# Patient Record
Sex: MALE | Race: WHITE | NOT HISPANIC OR LATINO | Employment: FULL TIME | URBAN - METROPOLITAN AREA
[De-identification: names, ages, dates, MRNs, and addresses within clinical notes are randomized per-mention and may not be internally consistent; named-entity substitution may affect disease eponyms.]

---

## 2023-10-05 PROBLEM — K21.9 HIATAL HERNIA WITH GERD: Status: ACTIVE | Noted: 2023-10-05

## 2023-10-05 PROBLEM — M67.819 TENDINOSIS OF ROTATOR CUFF: Status: ACTIVE | Noted: 2023-10-05

## 2023-10-05 PROBLEM — E66.9 OBESITY: Status: ACTIVE | Noted: 2023-10-05

## 2023-10-05 PROBLEM — K44.9 HIATAL HERNIA WITH GERD: Status: ACTIVE | Noted: 2023-10-05

## 2023-10-05 PROBLEM — M75.42 IMPINGEMENT SYNDROME OF LEFT SHOULDER: Status: ACTIVE | Noted: 2023-10-05

## 2023-10-05 PROBLEM — R07.9 CHEST PAIN: Status: ACTIVE | Noted: 2023-10-05

## 2023-10-05 PROBLEM — S46.012A TRAUMATIC TEAR OF LEFT ROTATOR CUFF: Status: ACTIVE | Noted: 2023-10-05

## 2023-10-05 PROBLEM — G47.33 OBSTRUCTIVE SLEEP APNEA SYNDROME: Status: ACTIVE | Noted: 2023-10-05

## 2023-10-05 RX ORDER — MULTIVIT-MIN/FERROUS FUMARATE 15 MG
1 TABLET ORAL DAILY
COMMUNITY
Start: 2020-09-21

## 2023-10-05 RX ORDER — ACETAMINOPHEN 500 MG
2 TABLET ORAL EVERY 8 HOURS
COMMUNITY
Start: 2023-06-23

## 2023-10-05 RX ORDER — PANTOPRAZOLE SODIUM 40 MG/1
1 TABLET, DELAYED RELEASE ORAL DAILY
COMMUNITY
Start: 2020-09-21

## 2023-10-05 RX ORDER — OXYCODONE HYDROCHLORIDE 5 MG/1
TABLET ORAL
COMMUNITY
Start: 2023-06-23

## 2023-10-05 RX ORDER — PANTOPRAZOLE SODIUM 20 MG/1
TABLET, DELAYED RELEASE ORAL
COMMUNITY

## 2023-10-05 RX ORDER — ONDANSETRON 4 MG/1
TABLET, FILM COATED ORAL
COMMUNITY
Start: 2023-06-23

## 2023-10-05 RX ORDER — ECHINACEA 400 MG
CAPSULE ORAL
COMMUNITY
Start: 2020-09-21

## 2023-10-05 RX ORDER — FENUGREEK SEED/BL.THISTLE/ANIS 340 MG
CAPSULE ORAL
COMMUNITY
Start: 2020-09-21

## 2023-10-05 RX ORDER — GARLIC 1000 MG
CAPSULE ORAL
COMMUNITY
Start: 2020-09-21

## 2023-10-05 RX ORDER — ACETAMINOPHEN 160 MG/5ML
SUSPENSION ORAL
COMMUNITY
Start: 2020-10-13

## 2023-10-05 RX ORDER — TURMERIC ROOT EXTRACT 500 MG
TABLET ORAL
COMMUNITY

## 2023-10-06 ENCOUNTER — OFFICE VISIT (OUTPATIENT)
Dept: ORTHOPEDIC SURGERY | Facility: HOSPITAL | Age: 63
End: 2023-10-06
Payer: COMMERCIAL

## 2023-10-06 VITALS — WEIGHT: 260 LBS | HEIGHT: 71 IN | BODY MASS INDEX: 36.4 KG/M2

## 2023-10-06 DIAGNOSIS — S46.012D TRAUMATIC TEAR OF LEFT ROTATOR CUFF, UNSPECIFIED TEAR EXTENT, SUBSEQUENT ENCOUNTER: ICD-10-CM

## 2023-10-06 PROCEDURE — G0463 HOSPITAL OUTPT CLINIC VISIT: HCPCS | Performed by: STUDENT IN AN ORGANIZED HEALTH CARE EDUCATION/TRAINING PROGRAM

## 2023-10-06 PROCEDURE — 99213 OFFICE O/P EST LOW 20 MIN: CPT | Performed by: STUDENT IN AN ORGANIZED HEALTH CARE EDUCATION/TRAINING PROGRAM

## 2023-10-06 NOTE — PROGRESS NOTES
Follow up Left shoulder arthroscopy, rotator cuff debridement, intra-articular debridement done 6/23/23. Genaro is still lacking ROM in his left shoulder and is experiencing pain with lateral raises. He has been more and more physically active, through physical therapy and working outside, which has led to increased shoulder soreness. Paresthesia reported following periods of prolonged activity. No other issues or concerns to report.

## 2023-10-10 NOTE — PROGRESS NOTES
PRIMARY CARE PHYSICIAN: Queta Mendiola MD    ORTHOPAEDIC POSTOPERATIVE VISIT    ASSESSMENT & PLAN    Impression: 63 y.o. male 3 months postop s/p left shoulder arthroscopic debridement.     Plan:   Overall he is doing well.  He has some residual stiffness in the shoulder.  He is continuing to work through this with physical therapy.  No other complaints at this time.    I reviewed the intraoperative findings with the patient and answered all their questions. I reviewed their postoperative timeline and plan with them. They understand the postoperative precautions and the treatment plan going forward.     Follow-Up: Patient will follow-up as needed    At the end of the visit, all questions were answered in full. The patient is in agreement with the plan and recommendations. They will call the office with any questions/concerns.      Note dictated with [employer] software. Completed without full typed error editing and sent to avoid delay.      SUBJECTIVE  CHIEF COMPLAINT:   Chief Complaint   Patient presents with    Left Shoulder - Post-op        HPI: Genaro Pandya is a 63 y.o. patient. Genaro Pandya is now postop status post left shoulder arthroscopic debridement    REVIEW OF SYSTEMS  Constitutional: See HPI for pain assessment, No significant weight loss, recent trauma  Cardiovascular: No chest pain, shortness of breath  Respiratory: No difficulty breathing, cough  Gastrointestinal: No nausea, vomiting, diarrhea, constipation  Musculoskeletal: Noted in HPI, positive for pain, restricted motion, stiffness  Integumentary: No rashes, easy bruising, redness   Neurological: no numbness or tingling in extremities, no gait disturbances   Psychiatric: No mood changes, memory changes, social issues  Heme/Lymph: no excessive swelling, easy bruising, excessive bleeding  ENT: No hearing changes  Eyes: No vision changes    CURRENT MEDICATIONS:   Current Outpatient Medications   Medication Sig Dispense  "Refill    oxyCODONE (Roxicodone) 5 mg immediate release tablet Take by mouth.      acetaminophen (Tylenol) 500 mg tablet Take 2 tablets (1,000 mg) by mouth every 8 hours.      acetaminophen 160 mg/5 mL (5 mL) suspension Take by mouth.      CREATINE MONOHYDRATE ORAL 0   once a day      docosahexaenoic acid/epa (FISH OIL ORAL) Take by mouth.      elderberry fruit and flower 460-115 mg capsule Take by mouth.      garlic 1,000 mg capsule Take by mouth.      Lactobac 42-Bifid [id number] (Probiotic Plus Colostrum) -50 mg powder in packet Take by mouth.  TAKE AS DIRECTED.      multivit-min-ferrous fumarate 15 mg iron tablet Take 1 tablet by mouth once daily.      MULTIVITAMIN ORAL Take 1 tablet by mouth once daily.      naproxen sodium (ALEVE ORAL) Take by mouth.      ondansetron (Zofran) 4 mg tablet Take by mouth.      pantoprazole (ProtoNix) 20 mg EC tablet Take by mouth.      pantoprazole (ProtoNix) 40 mg EC tablet Take 1 tablet (40 mg) by mouth once daily.      SAW PALMETTO ORAL Take by mouth.      turmeric root extract 500 mg tablet Take by mouth.      ubidecarenone (COENZYME Q10, BULK, MISC) Take by mouth. USE AS DIRECTED.       No current facility-administered medications for this visit.        OBJECTIVE    PHYSICAL EXAM      9/21/2020     2:30 PM 4/27/2022     8:18 AM 9/6/2022     8:14 AM 11/15/2022     9:47 AM 5/17/2023     8:21 AM 5/17/2023     8:24 AM 10/6/2023     3:49 PM   Vitals   Height (in) 1.803 m (5' 11\") 1.803 m (5' 11\") 1.803 m (5' 11\") 1.803 m (5' 11\") 1.803 m (5' 11\") 1.803 m (5' 11\") 1.803 m (5' 11\")   Weight (lb) 238 214 214 214 214 261.5 260   BMI 33.19 kg/m2 29.85 kg/m2 29.85 kg/m2 29.85 kg/m2 29.85 kg/m2 36.47 kg/m2 36.26 kg/m2   BSA (m2) 2.33 m2 2.21 m2 2.21 m2 2.21 m2 2.21 m2 2.44 m2 2.43 m2   Visit Report       Report      Body mass index is 36.26 kg/m².    General: Well-appearing, no acute distress    Skin intact bilateral upper and lower extremities  No erythema  No " warmth    Detailed examination of the left shoulder demonstrates:  Surgical incision(s) healing well, Steri-Strips in place  No erythema or warmth  No drainage  No ecchymosis  Resolving swelling, minimal  Biceps contour normal  Shoulder range of motion: Forward flexion to 120, ER 20, IR mid lumbar  Good resistance with Jobes, ER and belly press testing  Upper extremity motor grossly intact  C5-T1 sensation intact bilaterally  2+ radial pulses bilaterally  Warm and well-perfused, brisk capillary refill    IMAGING:   No new imaging      Arturo Hassan MD  Attending Surgeon    Sports Medicine Orthopaedic Surgery  [facility] [place]  [employer]

## 2023-10-12 ENCOUNTER — TELEPHONE (OUTPATIENT)
Dept: ORTHOPEDIC SURGERY | Facility: CLINIC | Age: 63
End: 2023-10-12
Payer: COMMERCIAL

## 2023-10-13 DIAGNOSIS — S46.012D TRAUMATIC TEAR OF LEFT ROTATOR CUFF, UNSPECIFIED TEAR EXTENT, SUBSEQUENT ENCOUNTER: ICD-10-CM

## 2023-10-20 ENCOUNTER — APPOINTMENT (OUTPATIENT)
Dept: ORTHOPEDIC SURGERY | Facility: HOSPITAL | Age: 63
End: 2023-10-20
Payer: COMMERCIAL

## 2023-10-31 ENCOUNTER — TELEPHONE (OUTPATIENT)
Dept: ORTHOPEDIC SURGERY | Facility: CLINIC | Age: 63
End: 2023-10-31
Payer: COMMERCIAL

## 2023-10-31 NOTE — TELEPHONE ENCOUNTER
Genaro called stating that he went his pain management the other day. He was told that they can't help him do to him being under your care. He's requesting some pain medication to be sent to his pharmacy.

## 2024-01-08 ENCOUNTER — OFFICE VISIT (OUTPATIENT)
Dept: ORTHOPEDIC SURGERY | Facility: CLINIC | Age: 64
End: 2024-01-08
Payer: COMMERCIAL

## 2024-01-08 VITALS — BODY MASS INDEX: 36.4 KG/M2 | WEIGHT: 260 LBS | HEIGHT: 71 IN

## 2024-01-08 DIAGNOSIS — S46.012D TRAUMATIC TEAR OF LEFT ROTATOR CUFF, UNSPECIFIED TEAR EXTENT, SUBSEQUENT ENCOUNTER: ICD-10-CM

## 2024-01-08 PROCEDURE — 99213 OFFICE O/P EST LOW 20 MIN: CPT | Performed by: STUDENT IN AN ORGANIZED HEALTH CARE EDUCATION/TRAINING PROGRAM

## 2024-01-08 NOTE — PROGRESS NOTES
PRIMARY CARE PHYSICIAN: Queta Mendiola MD    ORTHOPAEDIC POSTOPERATIVE VISIT    ASSESSMENT & PLAN    Impression: 63 y.o. male 6 months postop s/p left shoulder arthroscopic debridement.     Plan:   Overall he is doing well.  He has some residual stiffness in the shoulder.  He is planning to return to the gym in order to prepare himself for his return to work physical.  He is not yet ready to return to work and therefore will remain out of work until April 1.  At that time he will plan to return to work without restrictions.    I reviewed their postoperative timeline and plan with them. They understand the postoperative precautions and the treatment plan going forward.     Follow-Up: Patient will follow-up as needed    At the end of the visit, all questions were answered in full. The patient is in agreement with the plan and recommendations. They will call the office with any questions/concerns.      Note dictated with [employer] software. Completed without full typed error editing and sent to avoid delay.      SUBJECTIVE  CHIEF COMPLAINT: Left shoulder pain    HPI: Genaro Pandya is a 63 y.o. patient. Genaro Pandya is now postop status post left shoulder arthroscopic debridement. He feels he isn't where he needs to be to go back to work and that he would not be able to pass his physical at this time.  He has returned to the gym to work on his strength.    REVIEW OF SYSTEMS  Constitutional: See HPI for pain assessment, No significant weight loss, recent trauma  Cardiovascular: No chest pain, shortness of breath  Respiratory: No difficulty breathing, cough  Gastrointestinal: No nausea, vomiting, diarrhea, constipation  Musculoskeletal: Noted in HPI, positive for pain, restricted motion, stiffness  Integumentary: No rashes, easy bruising, redness   Neurological: no numbness or tingling in extremities, no gait disturbances   Psychiatric: No mood changes, memory changes, social issues  Heme/Lymph:  "no excessive swelling, easy bruising, excessive bleeding  ENT: No hearing changes  Eyes: No vision changes    CURRENT MEDICATIONS:   Current Outpatient Medications   Medication Sig Dispense Refill    acetaminophen (Tylenol) 500 mg tablet Take 2 tablets (1,000 mg) by mouth every 8 hours.      acetaminophen 160 mg/5 mL (5 mL) suspension Take by mouth.      CREATINE MONOHYDRATE ORAL 0   once a day      docosahexaenoic acid/epa (FISH OIL ORAL) Take by mouth.      elderberry fruit and flower 460-115 mg capsule Take by mouth.      garlic 1,000 mg capsule Take by mouth.      Lactobac 42-Bifid 1-rrniny-TRN (Probiotic Plus Colostrum) -50 mg powder in packet Take by mouth.  TAKE AS DIRECTED.      multivit-min-ferrous fumarate 15 mg iron tablet Take 1 tablet by mouth once daily.      MULTIVITAMIN ORAL Take 1 tablet by mouth once daily.      naproxen sodium (ALEVE ORAL) Take by mouth.      ondansetron (Zofran) 4 mg tablet Take by mouth.      oxyCODONE (Roxicodone) 5 mg immediate release tablet Take by mouth.      pantoprazole (ProtoNix) 20 mg EC tablet Take by mouth.      pantoprazole (ProtoNix) 40 mg EC tablet Take 1 tablet (40 mg) by mouth once daily.      SAW PALMETTO ORAL Take by mouth.      turmeric root extract 500 mg tablet Take by mouth.      ubidecarenone (COENZYME Q10, BULK, MISC) Take by mouth. USE AS DIRECTED.       No current facility-administered medications for this visit.        OBJECTIVE    PHYSICAL EXAM      4/27/2022     8:18 AM 9/6/2022     8:14 AM 11/15/2022     9:47 AM 5/17/2023     8:21 AM 5/17/2023     8:24 AM 8/14/2023    10:44 AM 10/6/2023     3:49 PM   Vitals   Height (in) 1.803 m (5' 11\") 1.803 m (5' 11\") 1.803 m (5' 11\") 1.803 m (5' 11\") 1.803 m (5' 11\") 1.803 m (5' 11\") 1.803 m (5' 11\")   Weight (lb) 214 214 214 214 261.5 261.5 260   BMI 29.85 kg/m2 29.85 kg/m2 29.85 kg/m2 29.85 kg/m2 36.47 kg/m2 36.47 kg/m2 36.26 kg/m2   BSA (m2) 2.21 m2 2.21 m2 2.21 m2 2.21 m2 2.44 m2 2.44 m2 2.43 m2 "   Visit Report       Report      There is no height or weight on file to calculate BMI.    General: Well-appearing, no acute distress    Skin intact bilateral upper and lower extremities  No erythema  No warmth    Detailed examination of the left shoulder demonstrates:  Surgical incision(s) well-healed  No erythema or warmth  No ecchymosis or soft tissue swelling  Biceps contour normal  Shoulder range of motion: Forward flexion to 150, ER 30, IR upper lumbar  Good resistance with Jobes, ER and belly press testing  Upper extremity motor grossly intact  C5-T1 sensation intact bilaterally  2+ radial pulses bilaterally  Warm and well-perfused, brisk capillary refill    IMAGING:   No new imaging      Arturo Hassan MD  Attending Surgeon    Sports Medicine Orthopaedic Surgery  [facility] [place]  [facility] [employer]

## 2024-01-17 ENCOUNTER — TELEPHONE (OUTPATIENT)
Dept: ORTHOPEDIC SURGERY | Facility: CLINIC | Age: 64
End: 2024-01-17
Payer: COMMERCIAL

## 2024-01-17 NOTE — TELEPHONE ENCOUNTER
alexandra called stating that is PT was denied due to us putting in for him to do the physical functionality test. Is this what you want him to do?

## 2024-02-19 ENCOUNTER — TELEPHONE (OUTPATIENT)
Dept: ORTHOPEDIC SURGERY | Facility: CLINIC | Age: 64
End: 2024-02-19
Payer: COMMERCIAL

## 2024-02-19 NOTE — TELEPHONE ENCOUNTER
Patient had a physical functionality test done. It is scanned into his chart. He wanted to know when we were going to go over those results with him. Does he need an in-person or can we do virtual?

## 2024-02-26 ENCOUNTER — TELEMEDICINE (OUTPATIENT)
Dept: ORTHOPEDIC SURGERY | Facility: CLINIC | Age: 64
End: 2024-02-26
Payer: COMMERCIAL

## 2024-02-26 DIAGNOSIS — M25.812 SHOULDER IMPINGEMENT, LEFT: Primary | ICD-10-CM

## 2024-02-26 PROCEDURE — 99442 PR PHYS/QHP TELEPHONE EVALUATION 11-20 MIN: CPT | Performed by: STUDENT IN AN ORGANIZED HEALTH CARE EDUCATION/TRAINING PROGRAM

## 2024-02-29 ENCOUNTER — TELEPHONE (OUTPATIENT)
Dept: ORTHOPEDIC SURGERY | Facility: CLINIC | Age: 64
End: 2024-02-29
Payer: COMMERCIAL

## 2024-02-29 NOTE — TELEPHONE ENCOUNTER
BRUCE called for an update on his [employer] with the correct work conditions based on the functional capacity report.    [phone] fax

## 2024-03-04 NOTE — PROGRESS NOTES
PRIMARY CARE PHYSICIAN: Queta Mendiola MD    ORTHOPAEDIC POSTOPERATIVE VISIT    ASSESSMENT & PLAN    Impression: 63 y.o. male s/p left shoulder arthroscopic debridement here via telephone to review his PT functional assessment.    Plan:   I reviewed his functional test with him.  The functional test demonstrates that he is capable of completing an 8-hour workday at the light physical demand level.  I reviewed this with him and answered all his questions.  He notes he is planning to return to work in some capacity.    Follow-Up: Patient will follow-up as needed    At the end of the visit, all questions were answered in full. The patient is in agreement with the plan and recommendations. They will call the office with any questions/concerns.    I spent a total of 15 minutes reviewing the patient's functional capacity results and formulating and discussing a treatment plan with him, reviewing these results with him and answering all his questions as well as documenting the visit.      Note dictated with [employer] software. Completed without full typed error editing and sent to avoid delay.      SUBJECTIVE  CHIEF COMPLAINT: Left shoulder pain    HPI: Genaro Pandya is a 63 y.o. patient who returns via telephone visit for follow-up evaluation of his left shoulder functional exam results.    Please see below for full history from prior visit:    Genaro Pandya is now postop status post left shoulder arthroscopic debridement. He feels he isn't where he needs to be to go back to work and that he would not be able to pass his physical at this time.  He has returned to the gym to work on his strength.    REVIEW OF SYSTEMS  Constitutional: See HPI for pain assessment, No significant weight loss, recent trauma  Cardiovascular: No chest pain, shortness of breath  Respiratory: No difficulty breathing, cough  Gastrointestinal: No nausea, vomiting, diarrhea, constipation  Musculoskeletal: Noted in HPI,  "positive for pain, restricted motion, stiffness  Integumentary: No rashes, easy bruising, redness   Neurological: no numbness or tingling in extremities, no gait disturbances   Psychiatric: No mood changes, memory changes, social issues  Heme/Lymph: no excessive swelling, easy bruising, excessive bleeding  ENT: No hearing changes  Eyes: No vision changes    CURRENT MEDICATIONS:   Current Outpatient Medications   Medication Sig Dispense Refill    acetaminophen (Tylenol) 500 mg tablet Take 2 tablets (1,000 mg) by mouth every 8 hours.      acetaminophen 160 mg/5 mL (5 mL) suspension Take by mouth.      CREATINE MONOHYDRATE ORAL 0   once a day      docosahexaenoic acid/epa (FISH OIL ORAL) Take by mouth.      elderberry fruit and flower 460-115 mg capsule Take by mouth.      garlic 1,000 mg capsule Take by mouth.      Lactobac 42-Bifid 4-[id number]-JXT (Probiotic Plus Colostrum) -50 mg powder in packet Take by mouth.  TAKE AS DIRECTED.      multivit-min-ferrous fumarate 15 mg iron tablet Take 1 tablet by mouth once daily.      MULTIVITAMIN ORAL Take 1 tablet by mouth once daily.      naproxen sodium (ALEVE ORAL) Take by mouth.      ondansetron (Zofran) 4 mg tablet Take by mouth.      oxyCODONE (Roxicodone) 5 mg immediate release tablet Take by mouth.      pantoprazole (ProtoNix) 20 mg EC tablet Take by mouth.      pantoprazole (ProtoNix) 40 mg EC tablet Take 1 tablet (40 mg) by mouth once daily.      SAW PALMETTO ORAL Take by mouth.      turmeric root extract 500 mg tablet Take by mouth.      ubidecarenone (COENZYME Q10, BULK, MISC) Take by mouth. USE AS DIRECTED.       No current facility-administered medications for this visit.        OBJECTIVE    PHYSICAL EXAM      9/6/2022     8:14 AM 11/15/2022     9:47 AM 5/17/2023     8:21 AM 5/17/2023     8:24 AM 8/14/2023    10:44 AM 10/6/2023     3:49 PM 1/8/2024     2:49 PM   Vitals   Height (in) 1.803 m (5' 11\") 1.803 m (5' 11\") 1.803 m (5' 11\") 1.803 m (5' 11\") 1.803 m (5' " "11\") 1.803 m (5' 11\") 1.803 m (5' 11\")   Weight (lb) 214 214 214 261.5 261.5 260 260   BMI 29.85 kg/m2 29.85 kg/m2 29.85 kg/m2 36.47 kg/m2 36.47 kg/m2 36.26 kg/m2 36.26 kg/m2   BSA (m2) 2.21 m2 2.21 m2 2.21 m2 2.44 m2 2.44 m2 2.43 m2 2.43 m2   Visit Report      Report Report      There is no height or weight on file to calculate BMI.    General: Well-appearing, no acute distress    Skin intact bilateral upper and lower extremities  No erythema  No warmth    Detailed examination of the left shoulder demonstrates:  Surgical incision(s) well-healed  No erythema or warmth  No ecchymosis or soft tissue swelling  Biceps contour normal  Shoulder range of motion: Forward flexion to 150, ER 30, IR upper lumbar  Good resistance with Jobes, ER and belly press testing  Upper extremity motor grossly intact  C5-T1 sensation intact bilaterally  2+ radial pulses bilaterally  Warm and well-perfused, brisk capillary refill    IMAGING:   No new imaging      Arturo Hassan MD  Attending Surgeon    Sports Medicine Orthopaedic Surgery  [facility] [place]  [facility] [employer]   "